# Patient Record
Sex: MALE | Race: WHITE
[De-identification: names, ages, dates, MRNs, and addresses within clinical notes are randomized per-mention and may not be internally consistent; named-entity substitution may affect disease eponyms.]

---

## 2017-04-01 ENCOUNTER — HOSPITAL ENCOUNTER (EMERGENCY)
Dept: HOSPITAL 4 - SED | Age: 47
Discharge: HOME | End: 2017-04-01
Payer: MEDICAID

## 2017-04-01 VITALS — SYSTOLIC BLOOD PRESSURE: 122 MMHG

## 2017-04-01 VITALS — HEIGHT: 69 IN | WEIGHT: 220 LBS | BODY MASS INDEX: 32.58 KG/M2

## 2017-04-01 VITALS — SYSTOLIC BLOOD PRESSURE: 129 MMHG

## 2017-04-01 DIAGNOSIS — J20.9: Primary | ICD-10-CM

## 2017-04-01 DIAGNOSIS — Z88.0: ICD-10-CM

## 2017-04-01 DIAGNOSIS — E11.9: ICD-10-CM

## 2017-04-01 LAB
ALBUMIN SERPL BCP-MCNC: 3.8 G/DL (ref 3.4–4.8)
ALT SERPL W P-5'-P-CCNC: 40 U/L (ref 12–78)
ANION GAP SERPL CALCULATED.3IONS-SCNC: 9 MMOL/L (ref 5–15)
APPEARANCE UR: CLEAR
AST SERPL W P-5'-P-CCNC: 17 U/L (ref 10–37)
BASOPHILS # BLD AUTO: 0.1 K/UL (ref 0–0.2)
BASOPHILS NFR BLD AUTO: 0.7 % (ref 0–2)
BILIRUB SERPL-MCNC: 0.4 MG/DL (ref 0–1)
BILIRUB UR QL STRIP: NEGATIVE
BUN SERPL-MCNC: 12 MG/DL (ref 8–21)
CALCIUM SERPL-MCNC: 8.7 MG/DL (ref 8.4–11)
CHLORIDE SERPL-SCNC: 100 MMOL/L (ref 98–107)
COLOR UR: YELLOW
CREAT SERPL-MCNC: 1.01 MG/DL (ref 0.55–1.3)
EOSINOPHIL # BLD AUTO: 0.2 K/UL (ref 0–0.4)
EOSINOPHIL NFR BLD AUTO: 1.8 % (ref 0–4)
ERYTHROCYTE [DISTWIDTH] IN BLOOD BY AUTOMATED COUNT: 13.6 % (ref 9–15)
GFR SERPL CREATININE-BSD FRML MDRD: 101 ML/MIN (ref 90–?)
GLUCOSE SERPL-MCNC: 248 MG/DL (ref 70–99)
GLUCOSE UR STRIP-MCNC: (no result) MG/DL
HCT VFR BLD AUTO: 45 % (ref 36–54)
HGB BLD-MCNC: 15.3 G/DL (ref 14–18)
HGB UR QL STRIP: NEGATIVE
INR PPP: 1 (ref 0.8–1.2)
KETONES UR STRIP-MCNC: NEGATIVE MG/DL
LEUKOCYTE ESTERASE UR QL STRIP: NEGATIVE
LYMPHOCYTES # BLD AUTO: 2.1 K/UL (ref 1–5.5)
LYMPHOCYTES NFR BLD AUTO: 25.2 % (ref 20.5–51.5)
MCH RBC QN AUTO: 30 PG (ref 27–31)
MCHC RBC AUTO-ENTMCNC: 34 % (ref 32–36)
MCV RBC AUTO: 88 FL (ref 79–98)
MONOCYTES # BLD MANUAL: 1.1 K/UL (ref 0–1)
MONOCYTES # BLD MANUAL: 13.4 % (ref 1.7–9.3)
NEUTROPHILS # BLD AUTO: 5 K/UL (ref 1.8–7.7)
NEUTROPHILS NFR BLD AUTO: 58.9 % (ref 40–70)
NITRITE UR QL STRIP: NEGATIVE
PH UR STRIP: 6 [PH] (ref 5–8)
PLATELET # BLD AUTO: 218 K/UL (ref 130–430)
POTASSIUM SERPL-SCNC: 3.4 MMOL/L (ref 3.5–5.1)
PROT SERPL-MCNC: 7.5 G/DL (ref 6.4–8.3)
PROT UR QL STRIP: NEGATIVE
PROTHROMBIN TIME: 11 SECS (ref 9.5–12.5)
RBC # BLD AUTO: 5.13 MIL/UL (ref 4.2–6.2)
SODIUM SERPLBLD-SCNC: 136 MMOL/L (ref 136–145)
SP GR UR STRIP: <1.005 (ref 1–1.03)
UROBILINOGEN UR STRIP-MCNC: 0.2 MG/DL (ref 0.2–1)
WBC # BLD AUTO: 8.5 K/UL (ref 4.8–10.8)

## 2017-04-01 PROCEDURE — 80307 DRUG TEST PRSMV CHEM ANLYZR: CPT

## 2017-04-01 PROCEDURE — 96365 THER/PROPH/DIAG IV INF INIT: CPT

## 2017-04-01 PROCEDURE — 81003 URINALYSIS AUTO W/O SCOPE: CPT

## 2017-04-01 PROCEDURE — 94640 AIRWAY INHALATION TREATMENT: CPT

## 2017-04-01 PROCEDURE — 70450 CT HEAD/BRAIN W/O DYE: CPT

## 2017-04-01 PROCEDURE — 36415 COLL VENOUS BLD VENIPUNCTURE: CPT

## 2017-04-01 PROCEDURE — 93005 ELECTROCARDIOGRAM TRACING: CPT

## 2017-04-01 PROCEDURE — 85730 THROMBOPLASTIN TIME PARTIAL: CPT

## 2017-04-01 PROCEDURE — 82962 GLUCOSE BLOOD TEST: CPT

## 2017-04-01 PROCEDURE — 87040 BLOOD CULTURE FOR BACTERIA: CPT

## 2017-04-01 PROCEDURE — 99285 EMERGENCY DEPT VISIT HI MDM: CPT

## 2017-04-01 PROCEDURE — 85379 FIBRIN DEGRADATION QUANT: CPT

## 2017-04-01 PROCEDURE — 85025 COMPLETE CBC W/AUTO DIFF WBC: CPT

## 2017-04-01 PROCEDURE — 85610 PROTHROMBIN TIME: CPT

## 2017-04-01 PROCEDURE — 96375 TX/PRO/DX INJ NEW DRUG ADDON: CPT

## 2017-04-01 PROCEDURE — 83605 ASSAY OF LACTIC ACID: CPT

## 2017-04-01 PROCEDURE — 80053 COMPREHEN METABOLIC PANEL: CPT

## 2017-04-01 PROCEDURE — 71010: CPT

## 2017-04-02 LAB
AMPHETAMINES UR QL SCN: NEGATIVE
BARBITURATES UR QL SCN: NEGATIVE
BENZODIAZ UR QL SCN: NEGATIVE
BZE UR QL SCN: NEGATIVE
CANNABINOIDS UR QL SCN: NEGATIVE
METHADONE UR-SCNC: NEGATIVE UMOL/L
METHAMPHET UR-SCNC: NEGATIVE UMOL/L
OPIATES UR QL SCN: NEGATIVE
OXYCODONE SERPL-MCNC: NEGATIVE NG/ML
PCP UR QL SCN: NEGATIVE
TRICYCLICS UR-MCNC: NEGATIVE NG/ML
URINE PROPOXYPHENE SCREEN: NEGATIVE

## 2018-10-16 ENCOUNTER — HOSPITAL ENCOUNTER (OUTPATIENT)
Dept: HOSPITAL 4 - SDS | Age: 48
Discharge: HOME | End: 2018-10-16
Attending: PAIN MEDICINE
Payer: MEDICAID

## 2018-10-16 VITALS — BODY MASS INDEX: 35.61 KG/M2 | WEIGHT: 235 LBS | HEIGHT: 68 IN

## 2018-10-16 VITALS — SYSTOLIC BLOOD PRESSURE: 135 MMHG

## 2018-10-16 DIAGNOSIS — M51.16: Primary | ICD-10-CM

## 2018-10-16 DIAGNOSIS — Z98.890: ICD-10-CM

## 2018-10-16 DIAGNOSIS — Z88.0: ICD-10-CM

## 2018-10-16 DIAGNOSIS — Z79.899: ICD-10-CM

## 2018-10-16 DIAGNOSIS — E11.9: ICD-10-CM

## 2018-10-16 PROCEDURE — 62323 NJX INTERLAMINAR LMBR/SAC: CPT

## 2019-06-06 ENCOUNTER — HOSPITAL ENCOUNTER (EMERGENCY)
Dept: HOSPITAL 4 - SED | Age: 49
Discharge: HOME | End: 2019-06-06
Payer: MEDICAID

## 2019-06-06 VITALS — WEIGHT: 235 LBS | BODY MASS INDEX: 35.61 KG/M2 | HEIGHT: 68 IN

## 2019-06-06 VITALS — SYSTOLIC BLOOD PRESSURE: 134 MMHG

## 2019-06-06 VITALS — SYSTOLIC BLOOD PRESSURE: 160 MMHG

## 2019-06-06 DIAGNOSIS — E11.9: ICD-10-CM

## 2019-06-06 DIAGNOSIS — Z79.899: ICD-10-CM

## 2019-06-06 DIAGNOSIS — Z88.0: ICD-10-CM

## 2019-06-06 DIAGNOSIS — K52.9: Primary | ICD-10-CM

## 2019-06-06 DIAGNOSIS — I10: ICD-10-CM

## 2019-06-06 PROCEDURE — 82962 GLUCOSE BLOOD TEST: CPT

## 2019-06-06 PROCEDURE — 96375 TX/PRO/DX INJ NEW DRUG ADDON: CPT

## 2019-06-06 PROCEDURE — 96361 HYDRATE IV INFUSION ADD-ON: CPT

## 2019-06-06 PROCEDURE — 96374 THER/PROPH/DIAG INJ IV PUSH: CPT

## 2019-06-06 PROCEDURE — 99283 EMERGENCY DEPT VISIT LOW MDM: CPT

## 2019-06-06 NOTE — NUR
Patient given written and verbal discharge instructions and verbalizes 
understanding.  ER MD discussed with patient the results and treatment 
provided. Patient in stable condition. ID arm band removed. IV catheter removed 
intact and dressing applied, no active bleeding.

Rx of naproxen given. Patient educated on pain management and to follow up with 
PMD. Pain Scale 3/10 tolerable. Sent with pain medication.

Opportunity for questions provided and answered. Medication side effect fact 
sheet provided.

## 2019-06-06 NOTE — NUR
PATIENT CAME IN COMPLAINING OF WATERY DIRRHEA SINCE YESTERDAY X4 YESTERDAY AND 
X1 TODAY. PATIENT STATES THERE IS NO BLOOD. PATIENT STATES HE TOOK AMODIUM. 
PATIENT STATES HE HAS NO APPETITE. PATIENT COMPLAINING OF NAUSEA BUT NO 
VOMITING. PATIENT COMPLAINING OF PAIN 8/10 IN ABD. PATIENT GETS WORST WHEN HE 
DRINKS ANYTHING. PATIENT NOT COMPLAINING OF SOB. PATIENT ALERT AND ORIENTED X4.

## 2019-06-06 NOTE — NUR
# 20 gauge angiocath placed to LEFT AC.  Use of asceptic technique.  Opsite 
placed over site.  Blood return noted.  Flushed with 10 cc of normal saline.  
No evidence of infiltration noted.  Patient tolerated well.

## 2019-06-29 ENCOUNTER — HOSPITAL ENCOUNTER (EMERGENCY)
Dept: HOSPITAL 4 - SED | Age: 49
LOS: 1 days | Discharge: HOME | End: 2019-06-30
Payer: MEDICAID

## 2019-06-29 VITALS — HEIGHT: 69 IN | WEIGHT: 235 LBS | BODY MASS INDEX: 34.8 KG/M2

## 2019-06-29 VITALS — SYSTOLIC BLOOD PRESSURE: 143 MMHG

## 2019-06-29 DIAGNOSIS — R05: Primary | ICD-10-CM

## 2019-06-29 DIAGNOSIS — I10: ICD-10-CM

## 2019-06-29 DIAGNOSIS — Z88.0: ICD-10-CM

## 2019-06-29 DIAGNOSIS — Z79.84: ICD-10-CM

## 2019-06-29 DIAGNOSIS — Z90.89: ICD-10-CM

## 2019-06-29 DIAGNOSIS — E11.9: ICD-10-CM

## 2019-06-29 LAB
BASOPHILS # BLD AUTO: 0.1 K/UL (ref 0–0.2)
BASOPHILS NFR BLD AUTO: 1.2 % (ref 0–2)
EOSINOPHIL # BLD AUTO: 0.3 K/UL (ref 0–0.4)
EOSINOPHIL NFR BLD AUTO: 5.1 % (ref 0–4)
ERYTHROCYTE [DISTWIDTH] IN BLOOD BY AUTOMATED COUNT: 13.7 % (ref 9–15)
HCT VFR BLD AUTO: 42.5 % (ref 36–54)
HGB BLD-MCNC: 14.5 G/DL (ref 14–18)
LYMPHOCYTES # BLD AUTO: 2.3 K/UL (ref 1–5.5)
LYMPHOCYTES NFR BLD AUTO: 34.7 % (ref 20.5–51.5)
MCH RBC QN AUTO: 30 PG (ref 27–31)
MCHC RBC AUTO-ENTMCNC: 34 % (ref 32–36)
MCV RBC AUTO: 90 FL (ref 79–98)
MONOCYTES # BLD MANUAL: 0.9 K/UL (ref 0–1)
MONOCYTES # BLD MANUAL: 13.4 % (ref 1.7–9.3)
NEUTROPHILS # BLD AUTO: 3 K/UL (ref 1.8–7.7)
NEUTROPHILS NFR BLD AUTO: 45.6 % (ref 40–70)
PLATELET # BLD AUTO: 251 K/UL (ref 130–430)
RBC # BLD AUTO: 4.75 MIL/UL (ref 4.2–6.2)
WBC # BLD AUTO: 6.7 K/UL (ref 4.8–10.8)

## 2019-06-29 PROCEDURE — 36415 COLL VENOUS BLD VENIPUNCTURE: CPT

## 2019-06-29 PROCEDURE — 83880 ASSAY OF NATRIURETIC PEPTIDE: CPT

## 2019-06-29 PROCEDURE — 85025 COMPLETE CBC W/AUTO DIFF WBC: CPT

## 2019-06-29 PROCEDURE — 71045 X-RAY EXAM CHEST 1 VIEW: CPT

## 2019-06-29 PROCEDURE — 84484 ASSAY OF TROPONIN QUANT: CPT

## 2019-06-29 PROCEDURE — 94640 AIRWAY INHALATION TREATMENT: CPT

## 2019-06-29 PROCEDURE — 80053 COMPREHEN METABOLIC PANEL: CPT

## 2019-06-29 PROCEDURE — 99284 EMERGENCY DEPT VISIT MOD MDM: CPT

## 2019-06-29 NOTE — NUR
Patient arrive via POV, AAOx4, and ambulatory with steady gait. Patient states 
c/c of perisitent cough, unable to sleep, increased salivation, productive 
cough. Patietn states it began Tuesday, he went to urgent care on Thursday, and 
patient had no relief from prescribed medications. Will continue to follow up 
and monitor.

## 2019-06-30 VITALS — SYSTOLIC BLOOD PRESSURE: 143 MMHG

## 2019-06-30 LAB
ALBUMIN SERPL BCP-MCNC: 3.6 G/DL (ref 3.4–4.8)
ALT SERPL W P-5'-P-CCNC: 43 U/L (ref 12–78)
ANION GAP SERPL CALCULATED.3IONS-SCNC: 13 MMOL/L (ref 5–15)
AST SERPL W P-5'-P-CCNC: 21 U/L (ref 10–37)
BILIRUB SERPL-MCNC: 0.3 MG/DL (ref 0–1)
BUN SERPL-MCNC: 23 MG/DL (ref 8–21)
CALCIUM SERPL-MCNC: 9.1 MG/DL (ref 8.4–11)
CHLORIDE SERPL-SCNC: 105 MMOL/L (ref 98–107)
CREAT SERPL-MCNC: 1.13 MG/DL (ref 0.55–1.3)
GFR SERPL CREATININE-BSD FRML MDRD: 88 ML/MIN (ref 90–?)
GLUCOSE SERPL-MCNC: 261 MG/DL (ref 70–99)
POTASSIUM SERPL-SCNC: 3.8 MMOL/L (ref 3.5–5.1)
SODIUM SERPLBLD-SCNC: 141 MMOL/L (ref 136–145)

## 2019-06-30 NOTE — NUR
Patient given written and verbal discharge instructions and verbalizes 
understanding.  ER MD discussed with patient the results and treatment 
provided. Patient in stable condition. ID arm band removed.

Rx of Zithromax and Prednisone given. Patient educated on pain management and 
to follow up with PMD. Pain Scale 0/10.

Opportunity for questions provided and answered. Medication side effect fact 
sheet provided.

## 2023-04-15 ENCOUNTER — HOSPITAL ENCOUNTER (EMERGENCY)
Dept: HOSPITAL 4 - SED | Age: 53
Discharge: HOME | End: 2023-04-15
Payer: COMMERCIAL

## 2023-04-15 VITALS — HEIGHT: 68 IN | BODY MASS INDEX: 33.34 KG/M2 | WEIGHT: 220 LBS

## 2023-04-15 VITALS — SYSTOLIC BLOOD PRESSURE: 130 MMHG

## 2023-04-15 VITALS — SYSTOLIC BLOOD PRESSURE: 162 MMHG

## 2023-04-15 DIAGNOSIS — G89.29: ICD-10-CM

## 2023-04-15 DIAGNOSIS — M54.17: Primary | ICD-10-CM

## 2023-04-15 DIAGNOSIS — I10: ICD-10-CM

## 2023-04-15 DIAGNOSIS — M54.50: ICD-10-CM

## 2023-04-15 DIAGNOSIS — Z88.0: ICD-10-CM

## 2023-04-15 DIAGNOSIS — Z79.899: ICD-10-CM

## 2023-04-15 DIAGNOSIS — E11.9: ICD-10-CM

## 2023-04-15 PROCEDURE — 96372 THER/PROPH/DIAG INJ SC/IM: CPT

## 2023-04-15 PROCEDURE — 99283 EMERGENCY DEPT VISIT LOW MDM: CPT

## 2023-09-23 NOTE — NUR
-------------------------------------------------------------------------------

          *** Note shannaone in EDM - 04/15/23 at 0319 by [email or web address] ***           

-------------------------------------------------------------------------------

Admit bed requested 



Patient will be admitted to care of Dr. AMBROSE.  

Admitted to TELEMETRY unit.

Diagnosis SEPSIS

Inpatient (Yes or No)  YES

Observation (Yes or No) NO

Orientation concerns or request close to nursing station  (Yes or No) NO

Covid Status NEGATIVE

On vent or bipap NO

Isolation requirements NO

Needs a sitter NO

From Home (Yes or if No enter name of facility) YES

Requires Dialysis (Yes or No) NO

Med Rec Completed (Yes of No) PENDING
ER Dr. Segovia at bedside examining patient.
Patient given written and verbal discharge instructions and verbalizes 
understanding.  ER Dr Segovia discussed with patient the results and treatment 
provided. Patient in stable condition. ID arm band removed. 

Rx of Lidocaine and Robaxin given. Patient educated on pain management and to 
follow up with PMD. Pain Scale 2/10.

Opportunity for questions provided and answered. Medication side effect fact 
sheet provided.
Patient to ER bed 06 to gown for evaluation. Side rails up.

Report given to JOE GILLESPIE.
Pt bib self from home, ambulated to bed 6 with assist of walker. Pt A&Ox4, able 
to make needs known. Pt c/o constant back pain. Pt rates pain 9/10. Pt 
describes pain as sharp. Pt states he took hydrocodone at 1400 and 1900 on 
04/14/2023.  Pt denies SOB and chest pain. Pt denies N/V/D and fever/chills. Pt 
has a hx of HTN and DM. Safety measures in place.
Imaging Studies/Medications